# Patient Record
Sex: FEMALE | URBAN - METROPOLITAN AREA
[De-identification: names, ages, dates, MRNs, and addresses within clinical notes are randomized per-mention and may not be internally consistent; named-entity substitution may affect disease eponyms.]

---

## 2022-11-18 ENCOUNTER — OFFICE VISIT (OUTPATIENT)
Dept: URBAN - METROPOLITAN AREA CLINIC 7 | Facility: CLINIC | Age: 67
End: 2022-11-18
Payer: COMMERCIAL

## 2022-11-18 DIAGNOSIS — E11.9 TYPE 2 DIABETES MELLITUS WITHOUT COMPLICATIONS: Primary | ICD-10-CM

## 2022-11-18 DIAGNOSIS — H40.9 UNSPECIFIED GLAUCOMA: ICD-10-CM

## 2022-11-18 DIAGNOSIS — H25.13 AGE-RELATED NUCLEAR CATARACT, BILATERAL: ICD-10-CM

## 2022-11-18 PROCEDURE — 92134 CPTRZ OPH DX IMG PST SGM RTA: CPT | Performed by: OPHTHALMOLOGY

## 2022-11-18 PROCEDURE — 99204 OFFICE O/P NEW MOD 45 MIN: CPT | Performed by: OPHTHALMOLOGY

## 2022-11-18 ASSESSMENT — INTRAOCULAR PRESSURE
OS: 14
OD: 15

## 2022-11-18 NOTE — IMPRESSION/PLAN
Impression: Type 2 diabetes mellitus without complications: H76.1. Plan: --exam reveals no evidence of retinopathy OU
--OCT: good macular contour and absence of edema OU
--treatment not indicated at this time
--BS/BP/lipid control discussed
--continue regular f/u with PCP
--pt instructed to call with s/s dec [facility]
--pt will continue to see Dr. Eduardo Polanco for monitoring RTC PRN

## 2022-11-18 NOTE — IMPRESSION/PLAN
Impression: Unspecified glaucoma: H40.9.  Bilateral. Plan: --cont brimonidine BID OU and Latatoprost QHS OU